# Patient Record
Sex: FEMALE | Race: BLACK OR AFRICAN AMERICAN | Employment: UNEMPLOYED | URBAN - METROPOLITAN AREA
[De-identification: names, ages, dates, MRNs, and addresses within clinical notes are randomized per-mention and may not be internally consistent; named-entity substitution may affect disease eponyms.]

---

## 2018-07-16 ENCOUNTER — HOSPITAL ENCOUNTER (OUTPATIENT)
Age: 83
Discharge: HOME | End: 2018-07-16
Attending: PSYCHIATRY & NEUROLOGY
Payer: MEDICARE

## 2018-07-16 DIAGNOSIS — I63.312 CEREBRAL INFARCTION DUE TO THROMBOSIS OF LEFT MIDDLE CEREBRAL ARTERY (HCC): ICD-10-CM

## 2018-07-16 PROCEDURE — 70551 MRI BRAIN STEM W/O DYE: CPT

## 2018-08-21 ENCOUNTER — HOSPITAL ENCOUNTER (OUTPATIENT)
Dept: LAB | Age: 83
Discharge: HOME | End: 2018-08-21
Payer: MEDICARE

## 2018-08-21 LAB
FOLATE SERPL-MCNC: >20 NG/ML (ref 3.1–17.5)
TSH SERPL DL<=0.05 MIU/L-ACNC: 1.74 UIU/ML (ref 0.36–3.74)
VIT B12 SERPL-MCNC: 219 PG/ML (ref 211–911)

## 2018-08-21 PROCEDURE — 36415 COLL VENOUS BLD VENIPUNCTURE: CPT | Performed by: PSYCHIATRY & NEUROLOGY

## 2018-08-21 PROCEDURE — 82607 VITAMIN B-12: CPT | Performed by: PSYCHIATRY & NEUROLOGY

## 2018-08-21 PROCEDURE — 84443 ASSAY THYROID STIM HORMONE: CPT | Performed by: PSYCHIATRY & NEUROLOGY

## 2018-10-05 ENCOUNTER — HOSPITAL ENCOUNTER (OUTPATIENT)
Dept: PHYSICAL THERAPY | Age: 83
Discharge: HOME | End: 2018-10-05
Payer: MEDICARE

## 2018-10-05 PROCEDURE — 92507 TX SP LANG VOICE COMM INDIV: CPT

## 2018-10-05 PROCEDURE — 92523 SPEECH SOUND LANG COMPREHEN: CPT

## 2018-10-05 PROCEDURE — G9186 MOTOR SPEECH GOAL STATUS: HCPCS

## 2018-10-05 PROCEDURE — G8999 MOTOR SPEECH CURRENT STATUS: HCPCS

## 2018-10-05 NOTE — PROGRESS NOTES
ST DAILY TREATMENT NOTE Patient Name: Peyman Todd Date:10/5/2018 : 1935 [x]  Patient  Verified Payor: [place] / Plan: Doreen Vu / Product Type: Medicare / In time: 1:00 Out time: 2:06 Total Treatment Time (min): 66 Visit #: 1 of 39 SUBJECTIVE Pain Level (0-10 scale): 0 Any medication changes, allergies to medications, adverse drug reactions, diagnosis change, or new procedure performed?: [x] No    [] Yes (see summary sheet for update) Subjective functional status/changes:   [x] No changes reported \"Hard to talk\" Date of Onset: ~1 year ago Social History: Pt lives with her daughter, Garrett Horta. She has a supportive Bahai friend, who reji her to today's [employer].  
Prior Functional level: Pt's friend reports pt has been consuming soft and pureed foods d/t dental status; she previously has a loud vocal quality and was verbose. No prior cognitive or speech deficits OBJECTIVE 
 
OUTPATIENT SPEECH-LANGUAGE EVALUATION Receptive Language: 
Receptive vocabulary    [x] WNL    [] Impaired [] Mild [] Mod [] Severe Reliability of yes/no responses to questions [x] WNL    [] Impaired [] Mild [] Mod [] Severe Reliability of responses to complex ideation [x] WNL    [] Impaired [] Mild [] Mod [] Severe Auditory retention/processing   [x] WNL    [] Impaired [] Mild [] Mod [] Severe Follow commands [x] 1 Step [x] 2 Step [x] 3 Step [] complex Objective Information: 
 
Expressive Language Automatic speech   [] WNL    [x] Impaired [x] Mild [] Mod [] Severe Able to identify objects/pictures  [x] WNL    [] Impaired [] Mild [] Mod [] Severe Preservations    [x] WNL    [] Impaired [] Mild [] Mod [] Severe Word retrieval    [] WNL    [x] Impaired [] Mild [x] Mod [] Severe Paraphasias   [x]None[] Literal    [] Phenomic   [] Jargon   [] Semantic Able to make needs known at level [x] Word   [] Phrase   [] Sentence   [x] Gesture  
     [] Unable Objective Information: Writing: [] L or [] R [] WNL    [] Impaired @ [] Word [] Sentence [] Paragraph DNT Reading:  [] WNL    [x] Impaired @ [] Word [x] Sentence [x] Paragraph Cognition: 
Attention: [x] Alert    [] Drowsy Orientation: [x] Person   [] Place    [] Time Memory: [] WNL    [x] Impaired ST   [] Impaired LT Comments: 
 
Executive Functions: 
Problem Solving: [] WNL     [] Impaired [] Mild [] Mod [] Severe Neglect:  [] None    [] Left   [] Right Self-regulation  [] Appropriate   [] Impulsive   [x] Requires verbal cues Awareness:  [] Poor initiation   [] Disinhibition   [x] Constant supervision Speech: 
Oral Verbal Apraxia: [] None    [] Mild    [] Moderate    [] Severe  ? ? Dysarthria:  [] None    [] Mild    [] Moderate    [x] Severe Intelligibility:  [] WNL    [x] Words   [] Phrases   [] Sentences   [] Conversation 
    % Intelligible: <50% Voice:   [] WNL    [] Hoarse   [] Breathy Comments:frequent pitch breaks and variability; rec ENT consult Fluency:  [] WNL    [x] Dysfluent: prolongations. The French Dysarthria Assessment 2nd Edition (FDA-2) was administered. Results are scored by a rating scale form from an A (normal function) to an E (no function) by 7 sections, including:  Reflexes, Respiration, lips, Palate, Laryngeal, Tongue, and Intelligibility. Results are as indicated: 
 
Reflexes- Cough A, Swallow A, Dribble/Drool A Respiration: At Rest C, At Speech D Lips: At Rest A, Spread A, Seal D, Alternate C, In speech D Palate: Fluids A, Maintenance A, In-Speech A Laryngeal: Time C, Pitch C--D, Volume D ,  In speech D , Tongue: At rest: D, Protrusion: D, Elevation: B, Lateral: C, Alternate: C, In Speech: D Intelligibility: Words: D, Sentences: D-E, Conversation: n/a 
 
A= Normal for age, B = Mild abnormality noticeable to skilled observer, C= Abnormality obvious but can perform task/movements with reasonable approximation, D= Some production of task but poor in quality, unable to sustain, inaccurate or extremely labored, E= Unable to undertake/ movement/ sound SLP conducted an expressive and receptive language evaluation, in addition to informally assessing cognitive-linguistic skills, utilizing the Western Aphasia Battery-Revised: In English:  
  
Western Aphasia Battery (WAB) - Part 1 Spontaneous Speech 
A.) Informal Content = 6/10 
B.) Fluency, Grammatical Competence, and Paraphasias = 4/10 Auditory Verbal Comprehension A.) Yes/No Questions = DNT: informally WNLS 
B.) Auditory Word Recognition = DNT: informally WNLS 
 
C.) Sequential Commands = 72/80  
   
Naming and Word Finding Total 
A.) Object Naming =76% acc with severe distortions/ omissions and reps B.) Word Fluency = 10/20  
C.) Sentence Completion = 10/10 
D.) Responsive Speech = 10/10  
   
 
Patient/Caregiver instruction/education: [x] Review HEP    [] Progressed/Changed HEP/Handouts given: Stimulus pictures; comp strategies for improved speech Pain Level (0-10 scale) post treatment: 2  
 
ASSESSMENT [x]  See Plan of Care PLAN 
[]  Upgrade activities as tolerated     [x]  Continue plan of care 
[]  Discharge due to:__ [x] Other: Tx initiated: teaching of comp strategies for speech intelligibility with describing pictures with 3+ word utterances KARINA Castellano 10/5/2018  4:25 PM 
MA, CCC-SLP Speech-Language Pathologist

## 2018-10-05 NOTE — PROGRESS NOTES
[facility] [address] [facility] 
[phone] fax Plan of Care/ Statement of Necessity for Speech Therapy Services Patient name: Maira Masterson Start of Care: 10/5/2018 Referral source: Maximo Grace NP : 1935 Medical Diagnosis: Slurred speech [R47.81] Onset Date: ~ 1 year ago Treatment Diagnosis: R47.1 dysarthria; F 80.1 expressive language deficit; R41.841 Cognitive communication deficit Prior Hospitalization: see medical history Provider#: [id number] Medications: Verified on Patient summary List  
 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 No prior cognitive or speech deficits The Plan of Care and following information is based on the information from the initial evaluation. Assessment/ key information: This 80 y/o female was referred to O/P skilled ST d/t communication decline over the past year. Her initial complaint was slurred speech. A comprehensive speech and language evaluation was conducted with informal observations. The French[place] Dysarthria Assessment 2nd Edition (FDA-2) was administered. Results are scored by a rating scale form from an A (normal function) to an E (no function) by 7 sections, including:  Reflexes, Respiration, lips, Palate, Laryngeal, Tongue, and Intelligibility. Results are as indicated: 
  
Reflexes- Cough A, Swallow A, Dribble/Drool A Respiration: At Rest C, At Speech D Lips: At Rest A, Spread A, Seal D, Alternate C, In speech D Palate: Fluids A, Maintenance A, In-Speech A Laryngeal: Time C, Pitch C--D, Volume D ,  In speech D , Tongue:  At rest: D, Protrusion: D, Elevation: B, Lateral: C, Alternate: C, In Speech: D 
 Intelligibility: Words: D, Sentences: D-E, Conversation: n/a 
  
A= Normal for age, B = Mild abnormality noticeable to skilled observer, C= Abnormality obvious but can perform task/movements with reasonable approximation, D= Some production of task but poor in quality, unable to sustain, inaccurate or extremely labored, E= Unable to undertake/ movement/ sound 
  
SLP conducted an expressive and receptive language evaluation, in addition to informally assessing cognitive-linguistic skills, utilizing the Western Aphasia Battery-Revised: In English:  
   
Western Aphasia Battery (WAB) - Part 1   
Spontaneous Speech 
A.) Informal Content = 6/10 
B.) Fluency, Grammatical Competence, and Paraphasias = 4/10 Auditory Verbal Comprehension A.) Yes/No Questions = DNT: informally WNLS 
B.) Auditory Word Recognition = DNT: informally WNLS 
  
C.) Sequential Commands = 72/80  
   
Naming and Word Finding Total 
A.) Object Naming =76% acc with severe distortions/ omissions and reps B.) Word Fluency = 10/20  
C.) Sentence Completion = 10/10 
D.) Responsive Speech = 10/10  
    
 
Pt did improve her intelligibility with increased communication demand, but did exhibit inconsistent grouping, paraphasias, oral motor weakness, and significant difficulty formulation connect speech utterances. Pt would benefit from skilled ST to address underlying severe dysarthria and moderate-severe expressive language deficit that is greatly impacted by motor speech deficit to promote functional communication for safety, independence, socialization, and QOL . Additionally, SLP would recommend additional diagnostic testing to further assess cognitive-linguistic skills and r/o apraxia in order to implement the most appropriate therapeutic interventions to improve functional communication. Problem List:    []dysphasic []dysarthric  []dysphagic 
     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 Treatment Plan may include any combination of the following: Aphasia Treatment, Dysarthria Treatment and Patient Education Patient / Family readiness to learn indicated by: asking questions, trying to perform skills and interest 
 
Persons(s) to be included in education:   patient (P) Barriers to Learning/Limitations: yes;  cognitive and sensory deficits-vision/hearing/speech Patient Goal (s): improve speech  Patient Self Reported Health Status: good Rehabilitation Potential: good Short Term Goals: To be accomplished in 4 weeks 1. The pt will increase quality and length of phonation by sustaining ah utilizing effective breath support for ~15 seconds in 3/3 sessions to increase functional speech intelligibility. 2. The patient will complete various oral motor exercises  x20 reps with Jazmyne in structured setting inorder to increase strength, coordination, and agility warranted for intelligible speech and to increase carryover for HEP. 3. Pt will produce bilabials in words and phrases with 85% acc with use of comp strategies (increase intensity, slow down, rate, and over-articulate) when provided with min-mod cues to improve articulation and overall speech intelligibility. 4. Patient will participate in additional diagnostic testing for motor speech (r/o apraxia), language, and cognition in order to implement most  appropriate therapeutic interventions. 5. Patient will formulate and verbally express basic thoughts given picture stimulus in structured setting using 3-5 words with use of comp strategies with 70% acc when provided with mod cues increase expressive language and formation of word utterances. Long Term Goals: To be accomplished in 12 weeks 1) Patient will develop functional and intelligible speech with use of compensatory strategies through the use of adequate labial and lingual function, adequate intensity, increased articulatory precision, and speech prosody with 80% acc given Jazmyne . 2) Patient will develop functional cognitive-linguistic communication based skills and utilize compensatory strategies to communicate want and needs effectively to different communication partners, maintain safety and participate socially in functional living environment with 80% acc with Jazmyne Frequency / Duration: Patient to be seen 2-3 times per week for 12 weeks: 
 
G Codes: Motor:  Current  CL= 60-79%  Goal  CJ= 20-39% The severity rating is based on the following outcomes:   
Western Aphasia Battery- WAB and National Outcomes Measures (NOMS); FDA-2; clinical judgment Patient/ Caregiver education and instruction: Diagnosis, prognosis, Compensatory Techniques, and Recommendations. HEP was provided with instructions to utilize ocmp strategies for improved speech intelligibility to describe pictures using 3+ word utterances. Subject+Action. Certification Period: 10/5/2018-1/3/19 Meri Dennis SLP 10/5/2018 4:23 PM 
MA, [facility]-SLP Speech-Language Pathologist 
 
________________________________________________________________________ I certify that the above Therapy Services are being furnished while the patient is under my care. I agree with the treatment plan and certify that this therapy is necessary. [address] [facility] Signature:____________Date:_________TIME:________ 
 
[employer], Date and Time must be completed for valid certification ** Please sign and return to [place]  [address] [facility] 
[phone] fax Thank you

## 2019-01-18 NOTE — PROGRESS NOTES
[facility] [address] [facility] 
[phone] fax Speech Therapy Discharge Summary Patient name: Chad Ramirez Start of Care: 10/5/2018 Referral source: Estee Patient, NP : 1935 Medical/Treatment Diagnosis: Dysarthria and anarthria [R47.1] Expressive language disorder [F80.1] Cognitive communication deficit [R41.841] Payor: [place] / Plan: [employer] / Product Type: Medicare /  Onset Date: ~ 1 year ago Prior Hospitalization: see medical history Provider#: [id number] Medications: Verified on Patient Summary List   
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 No prior cognitive or speech deficits Visits from Start of Care: 1   Missed Visits: 0 Reporting Period : 10/5/18 Summary of Care: 
Goal:1. The pt will increase quality and length of phonation by sustaining ah utilizing effective breath support for ~15 seconds in 3/3 sessions to increase functional speech intelligibility. Status at last note/certification: new goal at eval 
Status at discharge: Not met; did not return after eval. 
 
Goal:2. The patient will complete various oral motor exercises  x20 reps with Jazmyne in structured setting inorder to increase strength, coordination, and agility warranted for intelligible speech and to increase carryover for HEP. Status at last note/certification:new goal at eval 
Status at discharge: Not met; did not return after eval. 
 
Goal: 3.  Pt will produce bilabials in words and phrases with 85% acc with use of comp strategies (increase intensity, slow down, rate, and over-articulate) when provided with min-mod cues to improve articulation and overall speech intelligibility. Status at last note/certification:new goal at eval 
Status at discharge: Not met; did not return after eval. 
 
Goal: 4. Patient will participate in additional diagnostic testing for motor speech (r/o apraxia), language, and cognition in order to implement most  appropriate therapeutic interventions. Status at last note/certification:new goal at eval 
Status at discharge:Not met; did not return after eval. 
 
Goal:5. Patient will formulate and verbally express basic thoughts given picture stimulus in structured setting using 3-5 words with use of comp strategies with 70% acc when provided with mod cues increase expressive language and formation of word utterances. Status at last note/certification:new goal at eval 
Status at discharge:Not met; did not return after eval. 
 
 
 
ASSESSMENT: 
Pt did not return to ST following Eval.  
 
RECOMMENDATIONS: 
[x]Discontinue therapy: []Patient has reached or is progressing toward set goals [x]Patient is non-compliant or has abdicated 
    []Due to lack of appreciable progress towards set goals Hanh Leos, SLP 1/18/2019 11:31 AM 
MA, CCC-SLP Speech-Language Pathologist

## 2019-12-23 ENCOUNTER — HOME HEALTH ADMISSION (OUTPATIENT)
Dept: HOME HEALTH SERVICES | Facility: HOME HEALTH | Age: 84
End: 2019-12-23
Payer: MEDICARE

## 2019-12-27 ENCOUNTER — HOME CARE VISIT (OUTPATIENT)
Dept: HOME HEALTH SERVICES | Facility: HOME HEALTH | Age: 84
End: 2019-12-27
Payer: MEDICARE

## 2019-12-27 ENCOUNTER — HOME CARE VISIT (OUTPATIENT)
Dept: SCHEDULING | Facility: HOME HEALTH | Age: 84
End: 2019-12-27
Payer: MEDICARE

## 2019-12-27 VITALS
TEMPERATURE: 99 F | DIASTOLIC BLOOD PRESSURE: 82 MMHG | HEART RATE: 72 BPM | SYSTOLIC BLOOD PRESSURE: 180 MMHG | OXYGEN SATURATION: 98 %

## 2019-12-27 PROCEDURE — G0299 HHS/HOSPICE OF RN EA 15 MIN: HCPCS

## 2019-12-27 PROCEDURE — 3331090001 HH PPS REVENUE CREDIT

## 2019-12-27 PROCEDURE — 3331090002 HH PPS REVENUE DEBIT

## 2019-12-27 PROCEDURE — 400013 HH SOC

## 2019-12-28 PROCEDURE — 3331090001 HH PPS REVENUE CREDIT

## 2019-12-28 PROCEDURE — 3331090002 HH PPS REVENUE DEBIT

## 2019-12-29 PROCEDURE — 3331090002 HH PPS REVENUE DEBIT

## 2019-12-29 PROCEDURE — 3331090001 HH PPS REVENUE CREDIT

## 2019-12-30 PROCEDURE — 3331090001 HH PPS REVENUE CREDIT

## 2019-12-30 PROCEDURE — 3331090002 HH PPS REVENUE DEBIT

## 2019-12-31 ENCOUNTER — HOME CARE VISIT (OUTPATIENT)
Dept: HOME HEALTH SERVICES | Facility: HOME HEALTH | Age: 84
End: 2019-12-31
Payer: MEDICARE

## 2019-12-31 PROCEDURE — 3331090002 HH PPS REVENUE DEBIT

## 2019-12-31 PROCEDURE — 3331090001 HH PPS REVENUE CREDIT

## 2020-01-01 PROCEDURE — 3331090001 HH PPS REVENUE CREDIT

## 2020-01-01 PROCEDURE — 3331090002 HH PPS REVENUE DEBIT

## 2020-01-02 PROCEDURE — 3331090001 HH PPS REVENUE CREDIT

## 2020-01-02 PROCEDURE — 3331090002 HH PPS REVENUE DEBIT

## 2020-01-03 ENCOUNTER — HOME CARE VISIT (OUTPATIENT)
Dept: SCHEDULING | Facility: HOME HEALTH | Age: 85
End: 2020-01-03
Payer: MEDICARE

## 2020-01-03 PROCEDURE — 3331090001 HH PPS REVENUE CREDIT

## 2020-01-03 PROCEDURE — 3331090002 HH PPS REVENUE DEBIT

## 2020-01-04 PROCEDURE — 3331090002 HH PPS REVENUE DEBIT

## 2020-01-04 PROCEDURE — 3331090001 HH PPS REVENUE CREDIT

## 2020-01-05 PROCEDURE — 3331090002 HH PPS REVENUE DEBIT

## 2020-01-05 PROCEDURE — 3331090001 HH PPS REVENUE CREDIT

## 2020-01-06 ENCOUNTER — HOME CARE VISIT (OUTPATIENT)
Dept: SCHEDULING | Facility: HOME HEALTH | Age: 85
End: 2020-01-06
Payer: MEDICARE

## 2020-01-06 PROCEDURE — 3331090001 HH PPS REVENUE CREDIT

## 2020-01-06 PROCEDURE — 3331090002 HH PPS REVENUE DEBIT

## 2020-01-06 PROCEDURE — G0153 HHCP-SVS OF S/L PATH,EA 15MN: HCPCS

## 2020-01-06 PROCEDURE — G0299 HHS/HOSPICE OF RN EA 15 MIN: HCPCS

## 2020-01-07 VITALS
OXYGEN SATURATION: 99 % | HEART RATE: 72 BPM | DIASTOLIC BLOOD PRESSURE: 93 MMHG | SYSTOLIC BLOOD PRESSURE: 186 MMHG | TEMPERATURE: 97.9 F

## 2020-01-07 VITALS
DIASTOLIC BLOOD PRESSURE: 96 MMHG | SYSTOLIC BLOOD PRESSURE: 186 MMHG | OXYGEN SATURATION: 99 % | TEMPERATURE: 98.8 F | HEART RATE: 70 BPM | RESPIRATION RATE: 18 BRPM

## 2020-01-07 PROCEDURE — 3331090002 HH PPS REVENUE DEBIT

## 2020-01-07 PROCEDURE — 3331090001 HH PPS REVENUE CREDIT

## 2020-01-08 PROCEDURE — 3331090001 HH PPS REVENUE CREDIT

## 2020-01-08 PROCEDURE — 3331090002 HH PPS REVENUE DEBIT

## 2020-01-09 PROCEDURE — 3331090001 HH PPS REVENUE CREDIT

## 2020-01-09 PROCEDURE — 3331090002 HH PPS REVENUE DEBIT

## 2020-01-10 PROCEDURE — 3331090002 HH PPS REVENUE DEBIT

## 2020-01-10 PROCEDURE — 3331090001 HH PPS REVENUE CREDIT

## 2020-01-11 ENCOUNTER — HOME CARE VISIT (OUTPATIENT)
Dept: SCHEDULING | Facility: HOME HEALTH | Age: 85
End: 2020-01-11
Payer: MEDICARE

## 2020-01-11 VITALS
SYSTOLIC BLOOD PRESSURE: 159 MMHG | DIASTOLIC BLOOD PRESSURE: 76 MMHG | HEART RATE: 68 BPM | TEMPERATURE: 98 F | OXYGEN SATURATION: 98 %

## 2020-01-11 PROCEDURE — 3331090002 HH PPS REVENUE DEBIT

## 2020-01-11 PROCEDURE — 3331090001 HH PPS REVENUE CREDIT

## 2020-01-11 PROCEDURE — G0153 HHCP-SVS OF S/L PATH,EA 15MN: HCPCS

## 2020-01-12 PROCEDURE — 3331090001 HH PPS REVENUE CREDIT

## 2020-01-12 PROCEDURE — 3331090002 HH PPS REVENUE DEBIT

## 2020-01-13 ENCOUNTER — HOME CARE VISIT (OUTPATIENT)
Dept: SCHEDULING | Facility: HOME HEALTH | Age: 85
End: 2020-01-13
Payer: MEDICARE

## 2020-01-13 VITALS
RESPIRATION RATE: 18 BRPM | HEART RATE: 70 BPM | SYSTOLIC BLOOD PRESSURE: 186 MMHG | TEMPERATURE: 98.6 F | DIASTOLIC BLOOD PRESSURE: 70 MMHG

## 2020-01-13 VITALS
OXYGEN SATURATION: 98 % | DIASTOLIC BLOOD PRESSURE: 75 MMHG | TEMPERATURE: 98.5 F | SYSTOLIC BLOOD PRESSURE: 164 MMHG | HEART RATE: 80 BPM

## 2020-01-13 PROCEDURE — G0299 HHS/HOSPICE OF RN EA 15 MIN: HCPCS

## 2020-01-13 PROCEDURE — 3331090001 HH PPS REVENUE CREDIT

## 2020-01-13 PROCEDURE — 3331090002 HH PPS REVENUE DEBIT

## 2020-01-13 PROCEDURE — G0153 HHCP-SVS OF S/L PATH,EA 15MN: HCPCS

## 2020-01-14 PROCEDURE — 3331090001 HH PPS REVENUE CREDIT

## 2020-01-14 PROCEDURE — 3331090002 HH PPS REVENUE DEBIT

## 2020-01-15 PROCEDURE — 3331090002 HH PPS REVENUE DEBIT

## 2020-01-15 PROCEDURE — 3331090001 HH PPS REVENUE CREDIT

## 2020-01-16 ENCOUNTER — HOME CARE VISIT (OUTPATIENT)
Dept: SCHEDULING | Facility: HOME HEALTH | Age: 85
End: 2020-01-16
Payer: MEDICARE

## 2020-01-16 PROCEDURE — 3331090001 HH PPS REVENUE CREDIT

## 2020-01-16 PROCEDURE — G0153 HHCP-SVS OF S/L PATH,EA 15MN: HCPCS

## 2020-01-16 PROCEDURE — 3331090002 HH PPS REVENUE DEBIT

## 2020-01-17 VITALS
TEMPERATURE: 98.3 F | SYSTOLIC BLOOD PRESSURE: 147 MMHG | DIASTOLIC BLOOD PRESSURE: 78 MMHG | HEART RATE: 76 BPM | OXYGEN SATURATION: 99 %

## 2020-01-17 PROCEDURE — 3331090001 HH PPS REVENUE CREDIT

## 2020-01-17 PROCEDURE — 3331090002 HH PPS REVENUE DEBIT

## 2020-01-18 PROCEDURE — 3331090002 HH PPS REVENUE DEBIT

## 2020-01-18 PROCEDURE — 3331090001 HH PPS REVENUE CREDIT

## 2020-01-19 PROCEDURE — 3331090001 HH PPS REVENUE CREDIT

## 2020-01-19 PROCEDURE — 3331090002 HH PPS REVENUE DEBIT

## 2020-01-20 ENCOUNTER — HOME CARE VISIT (OUTPATIENT)
Dept: SCHEDULING | Facility: HOME HEALTH | Age: 85
End: 2020-01-20
Payer: MEDICARE

## 2020-01-20 VITALS
HEART RATE: 78 BPM | SYSTOLIC BLOOD PRESSURE: 146 MMHG | OXYGEN SATURATION: 99 % | DIASTOLIC BLOOD PRESSURE: 71 MMHG | TEMPERATURE: 98.3 F

## 2020-01-20 PROCEDURE — 3331090002 HH PPS REVENUE DEBIT

## 2020-01-20 PROCEDURE — 3331090001 HH PPS REVENUE CREDIT

## 2020-01-20 PROCEDURE — G0153 HHCP-SVS OF S/L PATH,EA 15MN: HCPCS

## 2020-01-20 PROCEDURE — G0299 HHS/HOSPICE OF RN EA 15 MIN: HCPCS

## 2020-01-21 PROCEDURE — 3331090002 HH PPS REVENUE DEBIT

## 2020-01-21 PROCEDURE — 3331090001 HH PPS REVENUE CREDIT

## 2020-01-22 VITALS
DIASTOLIC BLOOD PRESSURE: 80 MMHG | OXYGEN SATURATION: 99 % | RESPIRATION RATE: 20 BRPM | HEART RATE: 75 BPM | SYSTOLIC BLOOD PRESSURE: 168 MMHG | TEMPERATURE: 98.4 F

## 2020-01-22 PROCEDURE — 3331090001 HH PPS REVENUE CREDIT

## 2020-01-22 PROCEDURE — 3331090002 HH PPS REVENUE DEBIT

## 2020-01-23 PROCEDURE — 3331090002 HH PPS REVENUE DEBIT

## 2020-01-23 PROCEDURE — 3331090001 HH PPS REVENUE CREDIT

## 2020-01-24 PROCEDURE — 3331090002 HH PPS REVENUE DEBIT

## 2020-01-24 PROCEDURE — 3331090001 HH PPS REVENUE CREDIT

## 2020-01-25 ENCOUNTER — HOME CARE VISIT (OUTPATIENT)
Dept: SCHEDULING | Facility: HOME HEALTH | Age: 85
End: 2020-01-25
Payer: MEDICARE

## 2020-01-25 VITALS
HEART RATE: 66 BPM | DIASTOLIC BLOOD PRESSURE: 78 MMHG | SYSTOLIC BLOOD PRESSURE: 178 MMHG | TEMPERATURE: 97.8 F | OXYGEN SATURATION: 99 %

## 2020-01-25 PROCEDURE — G0153 HHCP-SVS OF S/L PATH,EA 15MN: HCPCS

## 2020-01-25 PROCEDURE — 3331090002 HH PPS REVENUE DEBIT

## 2020-01-25 PROCEDURE — 3331090001 HH PPS REVENUE CREDIT

## 2020-01-26 PROCEDURE — 3331090001 HH PPS REVENUE CREDIT

## 2020-01-26 PROCEDURE — 3331090002 HH PPS REVENUE DEBIT

## 2020-01-27 PROCEDURE — 3331090001 HH PPS REVENUE CREDIT

## 2020-01-27 PROCEDURE — 3331090002 HH PPS REVENUE DEBIT

## 2020-01-28 ENCOUNTER — HOME CARE VISIT (OUTPATIENT)
Dept: SCHEDULING | Facility: HOME HEALTH | Age: 85
End: 2020-01-28
Payer: MEDICARE

## 2020-01-28 PROCEDURE — 3331090002 HH PPS REVENUE DEBIT

## 2020-01-28 PROCEDURE — G0299 HHS/HOSPICE OF RN EA 15 MIN: HCPCS

## 2020-01-28 PROCEDURE — 3331090001 HH PPS REVENUE CREDIT

## 2020-01-28 PROCEDURE — 3331090003 HH PPS REVENUE ADJ

## 2020-01-29 VITALS
RESPIRATION RATE: 18 BRPM | DIASTOLIC BLOOD PRESSURE: 80 MMHG | OXYGEN SATURATION: 99 % | HEART RATE: 78 BPM | SYSTOLIC BLOOD PRESSURE: 164 MMHG | TEMPERATURE: 98.4 F

## 2020-01-29 PROCEDURE — 3331090001 HH PPS REVENUE CREDIT

## 2020-01-29 PROCEDURE — 3331090002 HH PPS REVENUE DEBIT
